# Patient Record
Sex: FEMALE | URBAN - METROPOLITAN AREA
[De-identification: names, ages, dates, MRNs, and addresses within clinical notes are randomized per-mention and may not be internally consistent; named-entity substitution may affect disease eponyms.]

---

## 2017-01-12 ENCOUNTER — APPOINTMENT (RX ONLY)
Dept: URBAN - METROPOLITAN AREA CLINIC 47 | Facility: CLINIC | Age: 42
Setting detail: DERMATOLOGY
End: 2017-01-12

## 2017-01-12 DIAGNOSIS — L81.1 CHLOASMA: ICD-10-CM | Status: INADEQUATELY CONTROLLED

## 2017-01-12 PROCEDURE — ? PRESCRIPTION

## 2017-01-12 PROCEDURE — ? COUNSELING

## 2017-01-12 PROCEDURE — 99213 OFFICE O/P EST LOW 20 MIN: CPT

## 2017-01-12 RX ORDER — HYDROQUINONE 4 %
CREAM (GRAM) TOPICAL
Qty: 1 | Refills: 3 | Status: ERX

## 2017-01-12 ASSESSMENT — LOCATION ZONE DERM: LOCATION ZONE: FACE

## 2017-01-12 ASSESSMENT — LOCATION SIMPLE DESCRIPTION DERM: LOCATION SIMPLE: LEFT FOREHEAD

## 2017-01-12 ASSESSMENT — LOCATION DETAILED DESCRIPTION DERM: LOCATION DETAILED: LEFT INFERIOR MEDIAL FOREHEAD

## 2023-04-03 ENCOUNTER — APPOINTMENT (RX ONLY)
Dept: URBAN - METROPOLITAN AREA CLINIC 108 | Facility: CLINIC | Age: 48
Setting detail: DERMATOLOGY
End: 2023-04-03

## 2023-04-03 VITALS — HEIGHT: 63 IN | WEIGHT: 140 LBS

## 2023-04-03 DIAGNOSIS — Z71.89 OTHER SPECIFIED COUNSELING: ICD-10-CM

## 2023-04-03 DIAGNOSIS — L91.0 HYPERTROPHIC SCAR: ICD-10-CM

## 2023-04-03 PROCEDURE — 99203 OFFICE O/P NEW LOW 30 MIN: CPT | Mod: 25

## 2023-04-03 PROCEDURE — ? INTRALESIONAL KENALOG

## 2023-04-03 PROCEDURE — ? COUNSELING

## 2023-04-03 PROCEDURE — 11900 INJECT SKIN LESIONS </W 7: CPT

## 2023-04-03 ASSESSMENT — LOCATION SIMPLE DESCRIPTION DERM
LOCATION SIMPLE: RIGHT THIGH
LOCATION SIMPLE: LEFT THIGH

## 2023-04-03 ASSESSMENT — LOCATION DETAILED DESCRIPTION DERM
LOCATION DETAILED: LEFT ANTERIOR PROXIMAL THIGH
LOCATION DETAILED: RIGHT ANTERIOR PROXIMAL THIGH

## 2023-04-03 ASSESSMENT — LOCATION ZONE DERM: LOCATION ZONE: LEG

## 2023-04-03 NOTE — PROCEDURE: INTRALESIONAL KENALOG
Kenalog Preparation: Kenalog
Which Kenalog Vial Was Used?: Kenalog 10 mg/ml (5 ml vial)
How Many Mls Were Removed From The 40 Mg/Ml (1ml) Vial When Preparing The Injectable Solution?: 0
Include Z78.9 (Other Specified Conditions Influencing Health Status) As An Associated Diagnosis?: No
Consent: The risks of atrophy were reviewed with the patient.
Concentration Of Kenalog Solution Injected (Mg/Ml): 10.0
How Many Mls Were Removed From The 10 Mg/Ml (5ml) Vial When Preparing The Injectable Solution?: 1
Detail Level: Detailed
Administered By (Optional): Rene Shearer PA-C
X Size Of Lesion In Cm (Optional): 0.5
Show Inventory Tab: Show
Validate Note Data When Using Inventory: Yes
Medical Necessity Clause: This procedure was medically necessary because the lesions that were treated were:

## 2023-06-28 ENCOUNTER — APPOINTMENT (RX ONLY)
Dept: URBAN - METROPOLITAN AREA CLINIC 108 | Facility: CLINIC | Age: 48
Setting detail: DERMATOLOGY
End: 2023-06-28

## 2023-06-28 VITALS — WEIGHT: 139 LBS | HEIGHT: 63 IN

## 2023-06-28 DIAGNOSIS — D485 NEOPLASM OF UNCERTAIN BEHAVIOR OF SKIN: ICD-10-CM

## 2023-06-28 PROBLEM — D48.5 NEOPLASM OF UNCERTAIN BEHAVIOR OF SKIN: Status: ACTIVE | Noted: 2023-06-28

## 2023-06-28 PROCEDURE — ? COUNSELING

## 2023-06-28 PROCEDURE — ? DEFER

## 2023-06-28 PROCEDURE — 99212 OFFICE O/P EST SF 10 MIN: CPT

## 2023-06-28 PROCEDURE — ? ADDITIONAL NOTES

## 2023-06-28 ASSESSMENT — LOCATION DETAILED DESCRIPTION DERM: LOCATION DETAILED: RIGHT ANTERIOR PROXIMAL THIGH

## 2023-06-28 ASSESSMENT — LOCATION ZONE DERM: LOCATION ZONE: LEG

## 2023-06-28 ASSESSMENT — LOCATION SIMPLE DESCRIPTION DERM: LOCATION SIMPLE: RIGHT THIGH

## 2023-06-28 NOTE — PROCEDURE: ADDITIONAL NOTES
Additional Notes: NO OV CHARGE PER DR. BENAVIDES
Detail Level: Simple
Render Risk Assessment In Note?: no